# Patient Record
Sex: FEMALE | Race: WHITE | NOT HISPANIC OR LATINO | Employment: OTHER | URBAN - METROPOLITAN AREA
[De-identification: names, ages, dates, MRNs, and addresses within clinical notes are randomized per-mention and may not be internally consistent; named-entity substitution may affect disease eponyms.]

---

## 2019-05-10 PROBLEM — Z91.09 ENVIRONMENTAL ALLERGIES: Status: ACTIVE | Noted: 2019-05-10

## 2019-05-10 PROBLEM — D69.1 PLATELET DISORDER: Status: ACTIVE | Noted: 2019-05-10

## 2019-05-10 PROBLEM — F32.A DEPRESSION: Status: ACTIVE | Noted: 2019-05-10

## 2019-05-10 PROBLEM — R45.89 ANXIETY ABOUT HEALTH: Status: ACTIVE | Noted: 2019-05-10

## 2019-05-10 PROBLEM — I10 ESSENTIAL HYPERTENSION: Status: ACTIVE | Noted: 2019-05-10

## 2019-05-10 PROBLEM — N18.30 CKD (CHRONIC KIDNEY DISEASE), STAGE III: Status: ACTIVE | Noted: 2019-05-10

## 2020-01-21 PROBLEM — D69.6 THROMBOCYTOPENIA: Status: ACTIVE | Noted: 2019-04-11

## 2020-01-21 PROBLEM — K21.9 GASTROESOPHAGEAL REFLUX DISEASE WITHOUT ESOPHAGITIS: Status: ACTIVE | Noted: 2019-04-11

## 2020-06-04 PROBLEM — N18.6 ESRD (END STAGE RENAL DISEASE): Status: ACTIVE | Noted: 2020-06-04

## 2020-07-01 ENCOUNTER — NURSE TRIAGE (OUTPATIENT)
Dept: ADMINISTRATIVE | Facility: CLINIC | Age: 75
End: 2020-07-01

## 2020-07-01 NOTE — TELEPHONE ENCOUNTER
Attempted to contact pt without success; all numbers on chart attempted at this time. An [facility]PP[facility] ([facility]) RN will reach out again tomorrow.    Reason for Disposition   No answer.  First attempt to contact caller.  Follow-up call scheduled within 15 minutes.    Protocols used: NO CONTACT OR DUPLICATE CONTACT CALL-[facility]-[place]

## 2020-07-02 NOTE — TELEPHONE ENCOUNTER
Attempted to contact patient on behalf of [facility]. No answer 2nd attempt. No follow up needed.  Reason for Disposition   Second attempt to contact family AND no contact made. Phone number verified.    Protocols used: NO CONTACT OR DUPLICATE CONTACT CALL-[facility]-[place]

## 2020-07-23 PROBLEM — D69.42: Status: ACTIVE | Noted: 2020-07-23

## 2020-07-25 PROBLEM — D72.0: Status: ACTIVE | Noted: 2019-04-11

## 2021-05-26 PROBLEM — T85.611A PERITONEAL DIALYSIS CATHETER DYSFUNCTION: Status: ACTIVE | Noted: 2021-05-26

## 2021-06-07 PROBLEM — T88.4XXA HARD TO INTUBATE: Status: ACTIVE | Noted: 2021-06-07

## 2021-06-18 PROBLEM — R60.0 LOWER EXTREMITY EDEMA: Status: ACTIVE | Noted: 2021-06-18

## 2021-06-18 PROBLEM — J44.1 COPD EXACERBATION: Status: ACTIVE | Noted: 2021-06-18

## 2021-06-18 PROBLEM — T14.8XXA OPEN WOUND: Status: ACTIVE | Noted: 2021-06-18

## 2021-06-18 PROBLEM — J44.9 CHRONIC OBSTRUCTIVE PULMONARY DISEASE: Status: ACTIVE | Noted: 2021-06-18

## 2021-06-18 PROBLEM — I50.9 CHF (CONGESTIVE HEART FAILURE): Status: ACTIVE | Noted: 2021-06-18

## 2021-06-18 PROBLEM — R54 FRAIL ELDERLY: Status: ACTIVE | Noted: 2021-06-18

## 2021-06-19 PROBLEM — E87.70 HYPERVOLEMIA: Status: ACTIVE | Noted: 2021-06-19

## 2021-06-20 ENCOUNTER — NURSE TRIAGE (OUTPATIENT)
Dept: ADMINISTRATIVE | Facility: CLINIC | Age: 76
End: 2021-06-20

## 2021-06-20 PROBLEM — Z99.81 CHRONIC RESPIRATORY FAILURE WITH HYPOXIA, ON HOME O2 THERAPY: Status: ACTIVE | Noted: 2021-06-20

## 2021-06-20 PROBLEM — J96.11 CHRONIC RESPIRATORY FAILURE WITH HYPOXIA, ON HOME O2 THERAPY: Status: ACTIVE | Noted: 2021-06-20

## 2021-06-20 PROBLEM — I50.42 CHRONIC COMBINED SYSTOLIC AND DIASTOLIC CONGESTIVE HEART FAILURE: Status: ACTIVE | Noted: 2021-06-18
